# Patient Record
Sex: FEMALE | Race: WHITE | URBAN - METROPOLITAN AREA
[De-identification: names, ages, dates, MRNs, and addresses within clinical notes are randomized per-mention and may not be internally consistent; named-entity substitution may affect disease eponyms.]

---

## 2018-11-13 ENCOUNTER — OFFICE VISIT (OUTPATIENT)
Dept: URBAN - METROPOLITAN AREA CLINIC 88 | Facility: CLINIC | Age: 83
End: 2018-11-13
Payer: MEDICARE

## 2018-11-13 DIAGNOSIS — Z96.1 PRESENCE OF INTRAOCULAR LENS: ICD-10-CM

## 2018-11-13 DIAGNOSIS — E11.9 TYPE 2 DIABETES MELLITUS W/O COMPLICATION: Primary | ICD-10-CM

## 2018-11-13 DIAGNOSIS — H40.013 OPEN ANGLE WITH BORDERLINE FINDINGS, LOW RISK, BILATERAL: ICD-10-CM

## 2018-11-13 DIAGNOSIS — H02.409 PTOSIS OF EYELID: ICD-10-CM

## 2018-11-13 DIAGNOSIS — H43.811 VITREOUS DEGENERATION, RIGHT EYE: ICD-10-CM

## 2018-11-13 DIAGNOSIS — H43.392 OTHER VITREOUS OPACITIES, LEFT EYE: ICD-10-CM

## 2018-11-13 PROCEDURE — 99214 OFFICE O/P EST MOD 30 MIN: CPT | Performed by: OPHTHALMOLOGY

## 2018-11-13 PROCEDURE — 92133 CPTRZD OPH DX IMG PST SGM ON: CPT | Performed by: OPHTHALMOLOGY

## 2018-11-13 ASSESSMENT — INTRAOCULAR PRESSURE
OD: 16
OS: 16

## 2018-11-13 NOTE — IMPRESSION/PLAN
Impression: Open angle with borderline findings, low risk, bilateral: H40.013. OS. Plan: Discussed diagnosis in detail with patient. Advised patient of condition. Will continue to observe condition and or symptoms.

## 2018-11-13 NOTE — IMPRESSION/PLAN
Impression: Ptosis of eyelid: H02.409. Plan: Discussed diagnosis in detail with patient. Advised patient of condition. Discussed risks and benefits and patient understands. Patient to schedule for superior 64 screening at patient's next visit to [facility].

## 2018-11-13 NOTE — IMPRESSION/PLAN
Impression: Vitreous degeneration, right eye: H43.811. Plan: Discussed diagnosis in detail with patient. There is no evidence of retinal pathology. All signs and risks of retinal detachment and tears were discussed in detail. Patient was advised if any changes of VA, flashes, floaters or if curtain fall in field of [address] Patient instructed to call the office immediately if any symptoms noted.

## 2018-11-13 NOTE — IMPRESSION/PLAN
Impression: Type 2 diabetes mellitus w/o complication: P37.5. Plan: Diabetes type II: no background retinopathy, no signs of neovascularization noted. Discussed ocular and systemic benefits of blood sugar control.

## 2018-11-13 NOTE — IMPRESSION/PLAN
Impression: Other vitreous opacities, left eye: H43.392. Plan: Discussed diagnosis in detail with patient. There is no evidence of retinal pathology. All signs and risks of retinal detachment and tears were discussed in detail. Patient was advised if any changes of VA, flashes, floaters or if curtain fall in field of [address] Patient instructed to call the office immediately if any symptoms noted.

## 2018-11-14 ENCOUNTER — OFFICE VISIT (OUTPATIENT)
Dept: URBAN - METROPOLITAN AREA CLINIC 88 | Facility: CLINIC | Age: 83
End: 2018-11-14
Payer: MEDICARE

## 2018-11-14 PROCEDURE — 92083 EXTENDED VISUAL FIELD XM: CPT | Performed by: OPHTHALMOLOGY

## 2018-11-14 PROCEDURE — 99213 OFFICE O/P EST LOW 20 MIN: CPT | Performed by: OPHTHALMOLOGY

## 2018-11-14 RX ORDER — LATANOPROST 50 UG/ML
0.005 % SOLUTION OPHTHALMIC
Qty: 1 | Refills: 6 | Status: INACTIVE
Start: 2018-11-14 | End: 2019-02-14

## 2018-11-14 ASSESSMENT — INTRAOCULAR PRESSURE
OS: 16
OD: 16

## 2018-11-14 NOTE — IMPRESSION/PLAN
Impression: Primary open-angle glaucoma, bilateral, mild stage: H40.1131. Plan: Discussed diagnosis in detail with patient. Based on the findings on the Visual Field patient will start on Latanoprost QHS OU. Discussed side effects using the Latanoprost. Recheck IOP in 2-3 months.

## 2019-01-10 ENCOUNTER — OFFICE VISIT (OUTPATIENT)
Dept: URBAN - METROPOLITAN AREA CLINIC 16 | Facility: CLINIC | Age: 84
End: 2019-01-10
Payer: MEDICARE

## 2019-01-10 DIAGNOSIS — H40.1131 PRIMARY OPEN-ANGLE GLAUCOMA, BILATERAL, MILD STAGE: Primary | ICD-10-CM

## 2019-01-10 PROCEDURE — 92012 INTRM OPH EXAM EST PATIENT: CPT | Performed by: OPTOMETRIST

## 2019-01-10 ASSESSMENT — INTRAOCULAR PRESSURE
OD: 19
OS: 16

## 2019-01-10 NOTE — IMPRESSION/PLAN
Impression: Primary open-angle glaucoma, bilateral, mild stage: H40.1131. Plan: No improvement on current medication. d/c latanoprost.  Begin Lumigan. RTC 1 month x IOP check.

## 2019-02-14 ENCOUNTER — OFFICE VISIT (OUTPATIENT)
Dept: URBAN - METROPOLITAN AREA CLINIC 16 | Facility: CLINIC | Age: 84
End: 2019-02-14
Payer: MEDICARE

## 2019-02-14 PROCEDURE — 92012 INTRM OPH EXAM EST PATIENT: CPT | Performed by: OPTOMETRIST

## 2019-02-14 RX ORDER — BIMATOPROST 0.1 MG/ML
0.01 % SOLUTION/ DROPS OPHTHALMIC
Qty: 2.5 | Refills: 6 | Status: INACTIVE
Start: 2019-02-14 | End: 2019-02-20

## 2019-02-14 ASSESSMENT — INTRAOCULAR PRESSURE
OD: 16
OS: 16

## 2019-02-14 NOTE — IMPRESSION/PLAN
Impression: Primary open-angle glaucoma, bilateral, mild stage: H40.1131. OU. Plan: IOP acceptable. Continue Lumigan QHS OU. 
RTC w/Dr. Clifton Osler in 8-9  months x complete exam.

## 2019-12-02 ENCOUNTER — OFFICE VISIT (OUTPATIENT)
Dept: URBAN - METROPOLITAN AREA CLINIC 29 | Facility: CLINIC | Age: 84
End: 2019-12-02
Payer: MEDICARE

## 2019-12-02 PROCEDURE — 92014 COMPRE OPH EXAM EST PT 1/>: CPT | Performed by: OPTOMETRIST

## 2019-12-02 ASSESSMENT — INTRAOCULAR PRESSURE
OD: 15
OS: 16

## 2019-12-02 ASSESSMENT — KERATOMETRY
OS: 43.88
OD: 44.63

## 2019-12-02 NOTE — IMPRESSION/PLAN
Impression: Presence of intraocular lens: Z96.1 OU. Plan: Condition is stable, no further treatment at this time. Monitor.

## 2019-12-02 NOTE — IMPRESSION/PLAN
Impression: Type 2 diabetes mellitus w/o complication: M88.2. OU. Plan: No Non-Proliferative Diabetic Retinopathy, no Diabetic Macular Edema and no Neovascularization of the iris, disc, or elsewhere. Discussed ocular and systemic benefits of blood sugar control. Send notes to PCP. Check annually. RTC 1 year x CEE.

## 2019-12-02 NOTE — IMPRESSION/PLAN
Impression: Primary open-angle glaucoma, bilateral, mild stage: H40.1131 OU. Plan: IOP stable today. Continue Lumigan QHS OU. RTC 4-6 months x IOP check, OCT RNFL.

## 2020-01-07 ENCOUNTER — OFFICE VISIT (OUTPATIENT)
Dept: URBAN - METROPOLITAN AREA CLINIC 23 | Facility: CLINIC | Age: 85
End: 2020-01-07
Payer: MEDICARE

## 2020-01-07 DIAGNOSIS — H02.423 MYOGENIC PTOSIS OF BILATERAL EYELIDS: Primary | ICD-10-CM

## 2020-01-07 PROCEDURE — 99214 OFFICE O/P EST MOD 30 MIN: CPT | Performed by: OPHTHALMOLOGY

## 2020-01-07 ASSESSMENT — INTRAOCULAR PRESSURE
OS: 18
OD: 16

## 2020-01-07 NOTE — IMPRESSION/PLAN
Impression: Myogenic ptosis of bilateral eyelids: H02.423. Plan: Discussed diagnosis in detail with patient. Discussed treatment options with patient. Surgical treatment is required. Surgical risks and benefits were discussed, explained and understood by patient. HVF 36 superior taped and untaped order today, if visually significant recommend Internal Bilateral Upper Eyelid Ptosis Repair. RL3.

## 2021-04-26 ENCOUNTER — OFFICE VISIT (OUTPATIENT)
Dept: URBAN - METROPOLITAN AREA CLINIC 88 | Facility: CLINIC | Age: 86
End: 2021-04-26
Payer: MEDICARE

## 2021-04-26 PROCEDURE — 99214 OFFICE O/P EST MOD 30 MIN: CPT | Performed by: OPHTHALMOLOGY

## 2021-04-26 ASSESSMENT — INTRAOCULAR PRESSURE
OD: 17
OS: 19

## 2021-04-26 NOTE — IMPRESSION/PLAN
Impression: Primary open-angle glaucoma, bilateral, mild stage: H40.1131 OU. Plan: Intraocular pressure well controlled, tolerating medications. Continue with Latanoprost QHS OU.

## 2021-04-26 NOTE — IMPRESSION/PLAN
Impression: Type 2 diabetes mellitus w/o complication: S47.5. Condition: established, stable. Plan: Diabetes type II: no background retinopathy, no signs of neovascularization noted. Discussed ocular and systemic benefits of blood sugar control.

## 2022-10-11 ENCOUNTER — OFFICE VISIT (OUTPATIENT)
Dept: URBAN - METROPOLITAN AREA CLINIC 88 | Facility: CLINIC | Age: 87
End: 2022-10-11
Payer: MEDICARE

## 2022-10-11 DIAGNOSIS — H35.373 PUCKERING OF MACULA, BILATERAL: ICD-10-CM

## 2022-10-11 DIAGNOSIS — E11.9 TYPE 2 DIABETES MELLITUS W/O COMPLICATION: Primary | ICD-10-CM

## 2022-10-11 DIAGNOSIS — Z96.1 PRESENCE OF INTRAOCULAR LENS: ICD-10-CM

## 2022-10-11 DIAGNOSIS — H40.1131 PRIMARY OPEN-ANGLE GLAUCOMA, BILATERAL, MILD STAGE: ICD-10-CM

## 2022-10-11 PROCEDURE — 92133 CPTRZD OPH DX IMG PST SGM ON: CPT | Performed by: OPHTHALMOLOGY

## 2022-10-11 PROCEDURE — 99214 OFFICE O/P EST MOD 30 MIN: CPT | Performed by: OPHTHALMOLOGY

## 2022-10-11 ASSESSMENT — INTRAOCULAR PRESSURE
OD: 15
OS: 16

## 2022-10-11 NOTE — IMPRESSION/PLAN
Impression: Type 2 diabetes mellitus w/o complication: X88.2. Plan: Reviewed with patient that no retinal vascular changes are occurring from diabetes. Patient to continue care with current medication provider for diabetes management. Importance of retinal exams reviewed. Will continue to observe with dilated examination in 12 months.

## 2022-10-11 NOTE — IMPRESSION/PLAN
Impression: Puckering of macula, bilateral: H35.373. Plan: Discussed with patient she may need a referral to a Retinal Surgeon. Observe.

## 2022-10-18 ENCOUNTER — OFFICE VISIT (OUTPATIENT)
Dept: URBAN - METROPOLITAN AREA CLINIC 88 | Facility: CLINIC | Age: 87
End: 2022-10-18
Payer: MEDICARE

## 2022-10-18 DIAGNOSIS — Z96.1 PRESENCE OF INTRAOCULAR LENS: ICD-10-CM

## 2022-10-18 DIAGNOSIS — H40.1131 PRIMARY OPEN-ANGLE GLAUCOMA, BILATERAL, MILD STAGE: Primary | ICD-10-CM

## 2022-10-18 PROCEDURE — 99213 OFFICE O/P EST LOW 20 MIN: CPT | Performed by: OPHTHALMOLOGY

## 2022-10-18 PROCEDURE — 92083 EXTENDED VISUAL FIELD XM: CPT | Performed by: OPHTHALMOLOGY

## 2022-10-18 ASSESSMENT — INTRAOCULAR PRESSURE
OD: 18
OS: 20

## 2022-10-18 NOTE — IMPRESSION/PLAN
Impression: Primary open-angle glaucoma, bilateral, mild stage: H40.1131 OU. Plan: Intraocular pressure well controlled, tolerating medications. VF changed from previous visit. D/c Latanoprost. Patient was given a sample of Vyzulta QHS OU. Discussed with patient she may need Laser treatment or Durysta. Risks/benefits were discussed.

## 2022-12-01 ENCOUNTER — OFFICE VISIT (OUTPATIENT)
Dept: URBAN - METROPOLITAN AREA CLINIC 88 | Facility: CLINIC | Age: 87
End: 2022-12-01
Payer: MEDICARE

## 2022-12-01 DIAGNOSIS — Z96.1 PRESENCE OF INTRAOCULAR LENS: ICD-10-CM

## 2022-12-01 DIAGNOSIS — H40.1131 PRIMARY OPEN-ANGLE GLAUCOMA, BILATERAL, MILD STAGE: Primary | ICD-10-CM

## 2022-12-01 PROCEDURE — 99212 OFFICE O/P EST SF 10 MIN: CPT | Performed by: OPHTHALMOLOGY

## 2022-12-01 RX ORDER — NETARSUDIL AND LATANOPROST OPHTHALMIC SOLUTION, 0.02%/0.005% .2; .05 MG/ML; MG/ML
SOLUTION/ DROPS OPHTHALMIC; TOPICAL
Qty: 0 | Refills: 0 | Status: ACTIVE
Start: 2022-12-01

## 2022-12-01 ASSESSMENT — INTRAOCULAR PRESSURE
OS: 14
OD: 14

## 2022-12-01 NOTE — IMPRESSION/PLAN
Impression: Primary open-angle glaucoma, bilateral, mild stage: H40.1131. Plan: Intraocular pressure is well controlled with Vyzulta. Per patient to costly. Patient was advised to finish sample of Vyzulta. Patient was given an RX for Rocklatan QHS OU. Recheck in 2 months for IOP. Discussed with patient she may need Laser treatment or Durysta implant. Risks/benefits were discussed.

## 2024-09-26 NOTE — IMPRESSION/PLAN
Impression: Ptosis of eyelid: H02.409. OS. Plan: Pt ed re: condition. Refer for consult w/Dr. Janelle Hood. Suicide Attempt